# Patient Record
Sex: MALE | URBAN - METROPOLITAN AREA
[De-identification: names, ages, dates, MRNs, and addresses within clinical notes are randomized per-mention and may not be internally consistent; named-entity substitution may affect disease eponyms.]

---

## 2021-11-22 ENCOUNTER — OFFICE VISIT (OUTPATIENT)
Dept: FAMILY MEDICINE | Facility: CLINIC | Age: 26
End: 2021-11-22
Payer: MEDICAID

## 2021-11-22 VITALS
SYSTOLIC BLOOD PRESSURE: 118 MMHG | RESPIRATION RATE: 20 BRPM | HEIGHT: 65 IN | DIASTOLIC BLOOD PRESSURE: 73 MMHG | HEART RATE: 70 BPM | WEIGHT: 115 LBS | TEMPERATURE: 98.2 F | OXYGEN SATURATION: 100 % | BODY MASS INDEX: 19.16 KG/M2

## 2021-11-22 DIAGNOSIS — Z02.89 ENCOUNTER FOR HEALTH EXAMINATION OF REFUGEE: Primary | ICD-10-CM
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
## 2021-11-22 DIAGNOSIS — F43.23 ADJUSTMENT DISORDER WITH MIXED ANXIETY AND DEPRESSED MOOD: ICD-10-CM

## 2021-11-22 DIAGNOSIS — Z23 HIGH PRIORITY FOR 2019-NCOV VACCINE: ICD-10-CM

## 2021-11-22 LAB
ALBUMIN SERPL-MCNC: 4.1 G/DL (ref 3.5–5)
ALP SERPL-CCNC: 77 U/L (ref 45–120)
ALT SERPL W P-5'-P-CCNC: 12 U/L (ref 0–45)
ANION GAP SERPL CALCULATED.3IONS-SCNC: 11 MMOL/L (ref 5–18)
AST SERPL W P-5'-P-CCNC: 16 U/L (ref 0–40)
BILIRUB SERPL-MCNC: 0.7 MG/DL (ref 0–1)
BUN SERPL-MCNC: 10 MG/DL (ref 8–22)
CALCIUM SERPL-MCNC: 9.9 MG/DL (ref 8.5–10.5)
CHLORIDE BLD-SCNC: 104 MMOL/L (ref 98–107)
CHOLEST SERPL-MCNC: 156 MG/DL
CO2 SERPL-SCNC: 24 MMOL/L (ref 22–31)
CREAT SERPL-MCNC: 0.9 MG/DL (ref 0.7–1.3)
FASTING STATUS PATIENT QL REPORTED: NO
GFR SERPL CREATININE-BSD FRML MDRD: >90 ML/MIN/1.73M2
GLUCOSE BLD-MCNC: 95 MG/DL (ref 70–125)
HDLC SERPL-MCNC: 39 MG/DL
HIV 1+2 AB+HIV1 P24 AG SERPL QL IA: NEGATIVE
LDLC SERPL CALC-MCNC: 95 MG/DL
Lab: NORMAL
PERFORMING LABORATORY: NORMAL
POTASSIUM BLD-SCNC: 3.9 MMOL/L (ref 3.5–5)
PROT SERPL-MCNC: 6.8 G/DL (ref 6–8)
SODIUM SERPL-SCNC: 139 MMOL/L (ref 136–145)
SPECIMEN STATUS: NORMAL
T PALLIDUM AB SER QL: NONREACTIVE
TEST NAME: NORMAL
TRIGL SERPL-MCNC: 111 MG/DL

## 2021-11-22 PROCEDURE — 80061 LIPID PANEL: CPT | Performed by: STUDENT IN AN ORGANIZED HEALTH CARE EDUCATION/TRAINING PROGRAM

## 2021-11-22 PROCEDURE — 86803 HEPATITIS C AB TEST: CPT | Performed by: STUDENT IN AN ORGANIZED HEALTH CARE EDUCATION/TRAINING PROGRAM

## 2021-11-22 PROCEDURE — 87340 HEPATITIS B SURFACE AG IA: CPT | Performed by: STUDENT IN AN ORGANIZED HEALTH CARE EDUCATION/TRAINING PROGRAM

## 2021-11-22 PROCEDURE — 0004A COVID-19,PF,PFIZER (12+ YRS): CPT | Performed by: STUDENT IN AN ORGANIZED HEALTH CARE EDUCATION/TRAINING PROGRAM

## 2021-11-22 PROCEDURE — 86481 TB AG RESPONSE T-CELL SUSP: CPT | Performed by: STUDENT IN AN ORGANIZED HEALTH CARE EDUCATION/TRAINING PROGRAM

## 2021-11-22 PROCEDURE — 86706 HEP B SURFACE ANTIBODY: CPT | Performed by: STUDENT IN AN ORGANIZED HEALTH CARE EDUCATION/TRAINING PROGRAM

## 2021-11-22 PROCEDURE — 86682 HELMINTH ANTIBODY: CPT | Mod: 90 | Performed by: STUDENT IN AN ORGANIZED HEALTH CARE EDUCATION/TRAINING PROGRAM

## 2021-11-22 PROCEDURE — 86704 HEP B CORE ANTIBODY TOTAL: CPT | Performed by: STUDENT IN AN ORGANIZED HEALTH CARE EDUCATION/TRAINING PROGRAM

## 2021-11-22 PROCEDURE — 91300 COVID-19,PF,PFIZER (12+ YRS): CPT | Performed by: STUDENT IN AN ORGANIZED HEALTH CARE EDUCATION/TRAINING PROGRAM

## 2021-11-22 PROCEDURE — 87389 HIV-1 AG W/HIV-1&-2 AB AG IA: CPT | Performed by: STUDENT IN AN ORGANIZED HEALTH CARE EDUCATION/TRAINING PROGRAM

## 2021-11-22 PROCEDURE — 86708 HEPATITIS A ANTIBODY: CPT | Performed by: STUDENT IN AN ORGANIZED HEALTH CARE EDUCATION/TRAINING PROGRAM

## 2021-11-22 PROCEDURE — 86780 TREPONEMA PALLIDUM: CPT | Performed by: STUDENT IN AN ORGANIZED HEALTH CARE EDUCATION/TRAINING PROGRAM

## 2021-11-22 PROCEDURE — 86787 VARICELLA-ZOSTER ANTIBODY: CPT | Performed by: STUDENT IN AN ORGANIZED HEALTH CARE EDUCATION/TRAINING PROGRAM

## 2021-11-22 PROCEDURE — 36415 COLL VENOUS BLD VENIPUNCTURE: CPT | Performed by: STUDENT IN AN ORGANIZED HEALTH CARE EDUCATION/TRAINING PROGRAM

## 2021-11-22 PROCEDURE — 99385 PREV VISIT NEW AGE 18-39: CPT | Mod: 25 | Performed by: STUDENT IN AN ORGANIZED HEALTH CARE EDUCATION/TRAINING PROGRAM

## 2021-11-22 PROCEDURE — 87591 N.GONORRHOEAE DNA AMP PROB: CPT | Performed by: STUDENT IN AN ORGANIZED HEALTH CARE EDUCATION/TRAINING PROGRAM

## 2021-11-22 PROCEDURE — 99000 SPECIMEN HANDLING OFFICE-LAB: CPT | Performed by: STUDENT IN AN ORGANIZED HEALTH CARE EDUCATION/TRAINING PROGRAM

## 2021-11-22 PROCEDURE — 80053 COMPREHEN METABOLIC PANEL: CPT | Performed by: STUDENT IN AN ORGANIZED HEALTH CARE EDUCATION/TRAINING PROGRAM

## 2021-11-22 ASSESSMENT — ENCOUNTER SYMPTOMS
DYSURIA: 0
FEVER: 0
HEADACHES: 0
VOMITING: 0
ABDOMINAL PAIN: 0
COLOR CHANGE: 0
CHILLS: 0
DIZZINESS: 0
DYSPHORIC MOOD: 0
SORE THROAT: 0
BACK PAIN: 0
PALPITATIONS: 0
ARTHRALGIAS: 0
HEMATURIA: 0
RHINORRHEA: 0
COUGH: 0
SHORTNESS OF BREATH: 0
SEIZURES: 0

## 2021-11-22 ASSESSMENT — MIFFLIN-ST. JEOR: SCORE: 1439.13

## 2021-11-22 NOTE — NURSING NOTE
Due to patient being non-English speaking/uses sign language, an  was used for this visit. Only for face-to-face interpretation by an external agency, date and length of interpretation can be found on the scanned worksheet.     name: Darrius  Agency: [employer]  Language: [place] Island[place]   Telephone number: [phone]  Type of interpretation: Group, spoken; number of participants: 2

## 2021-11-22 NOTE — PATIENT INSTRUCTIONS
Patient Education     Picadura de chinche   Las chinches son insectos diminutos, aproximadamente del tamaño de leticia semilla de manzana. Tienen un color marrón rojizo y son ligeramente aplanadas y ovaladas. Se alimentan de la braden de los seres humanos, frecuentemente de noche mientras las personas duermen. Las chinches se sienten atraídas por el calor del cuerpo y también por la respiración. A diferencia de otros parásitos, pueden vivir hasta un año sin comer. No tienen magaña y no saltan. Nikunj se arrastran muy rápido.  Las chinches no son peligrosas y, por lo general, no transmiten enfermedades.  Síntomas de la picadura  Los síntomas de las picaduras de chinche pueden ser diferentes para cada persona. Las picaduras pueden encontrarse en cualquier parte del cuerpo. Nikunj son más comunes en la piel que está expuesta. Busque estos síntomas de 1 a varios días después de la primera picadura:    Picazón    Erupción rojiza, que puede empezar siendo pequeña y agrandarse    Urticaria, marcas valladares inflamadas (ronchas), o áreas valladares con protuberancia que pican (habones). Estos pueden ser puntos o pueden cubrir un área cal.    Bultos pequeños, firmes, planos    Ampollas    Mateo de ampollas en línea, en leticia ofe con curvas o en zigzag    Reacción alérgica    Infección en la piel a causa de rascarse las picaduras  Dónde se esconden las chinches  Las chinches pueden encontrarse keyona en cualquier lugar en el que usted pasa tiempo, tanto dentro ramos fuera del hogar. [place] incluye hoteles, autobuses, trenes, barcos, hogares de ancianos y apartamentos. Las chinches pueden transportarse de un lugar a otro en costuras y pliegues de equipaje, bolsas de dormir o ropa doblada.  Pueden estar en lugares limpios o sucios. Debido a stock tamaño y forma, pueden entrar en lugares muy pequeños donde no se le ocurriría buscar. Tienden a encontrarse mayormente en muebles, mobiliarios alrededor de la casa, ropa y grietas y fisuras. Aquí hay  algunas áreas en las que pueden encontrarse:    Gray y colchones, especialmente en las costuras    Juntas de los thomas de la cama, o en la cabecera    Sábanas y mantas    Sillones, lexie forradas con ramos y otros muebles que tengan ramos    Alfombras, especialmente en los bordes    Equipaje o vernell    Ropa    Detrás de los decorados de pared, chilango, espejos y alarmas contra incendio, y en tomacorrientes eléctricos  Cómo encontrarlas  Por stock tamaño, las chinches pueden verse. Nikunj también pueden dejar algunas pistas:    Puntos negros (heces) en los colchones de las denise, especialmente alrededor de las costuras    Manchas de braden en las sábanas    Cáscaras que pueden audrey perdido  Cuidados en la casa    Los síntomas de la picadura generalmente desaparecen solos en 1 o 2 semanas.    Para ayudar a prevenir la infección, evite lo más posible rascarse las picaduras.    Para aliviar la picazón y la inflamación, use leticia pomada o crema de venta ar que contenga hidrocortisona.    Si necesita más alivio, colóquese leticia compresa de hielo sobre las picaduras. Use la compresa de hielo derrek un jose de 20 minutos por vez. Para hacer leticia compresa de hielo, coloque cubitos de hielo en elticia bolsa plástica y ciérrela arriba. Envuelva la bolsa en leticia toalla o un paño limpio y joseph. Nunca aplique hielo o leticia compresa de hielo directamente sobre la piel.    Si tiene muchas picaduras o le pica mucho, tome un antihistamínico oral de venta ar. Siga las instrucciones en el paquete.    En alysia de que leticia picadura se infecte, es posible que stock proveedor de atención médica le recete un antibiótico. Puede tratarse de leticia píldora que se artem por boca. O puede ser leticia crema para ponerse sobre la piel.    Si lo picaron en stock casa, hable con leticia empresa de control de plagas autorizada. Las chinches no viven en stock cuerpo. Viven en grietas en stock casa. La empresa puede inspeccionar stock casa y ayudarlo a deshacerse de los insectos de  manera colmenares.    Cuidados después del procedimiento  Asista a los controles con stock proveedor de atención médica o ilir lo que le haya indicado.  Cuándo buscar atención médica  Llame a stock proveedor de atención médica de inmediato ante cualquiera de los siguientes síntomas:    Fiebre de 100.4  F (38  C) o más brianna, o según lo que le haya indicado el proveedor de atención médica    Síntomas de infección en las picaduras, ramos inflamación y dolor que empeora, calor en el área o supuración de las picaduras    Signos de leticia reacción alérgica, ramos urticaria o erupción que se extiende  Cuándo llamar al 911  Llame al 911 si ocurre algo de lo siguiente:    Picazón o inflamación de la garganta    Silbidos al respirar    [id number] [employer] Todos los derechos reservados. Esta información no pretende sustituir la atención médica profesional. Sólo stock médico puede diagnosticar y tratar un problema de saul.

## 2021-11-22 NOTE — PROGRESS NOTES
Preceptor Attestation:    I discussed the patient with the resident and evaluated the patient in person. I have verified the content of the note, which accurately reflects my assessment of the patient and the plan of care.   Supervising Physician:  Brad Flores MD.

## 2021-11-22 NOTE — PROGRESS NOTES
Initial Refugee Screening Exam    PC staff should enter immunizations into the chart:       Tdap, MMR, and Hep B on 8/13/21    COVID-19 vaccine SINOVAC 7/14/21, 8/11/21     used this visit: Visit conducted in [place]an, provider bilingual.  In-person iterpreter present for entire visit.     HEALTH HISTORY    Concerns today: bed bugs in home (confirmed with photo) & bites on body    Country of Origin:  [facility]  Year left country of Origin: 2021  Other countries lived in and dates: 2018 lived in [place] for 8 months  Date of Arrival in [place]: 11/4/2021  Is our listed age correct? Yes  Do you go by any other name?: No  Native Language: [place]  Family in [place]: Yes, mother and father  Family in other countries:  [facility]    Pre-Departure Medical Screening Examination Reviewed:Yes - No major health conditions noted  Class A conditions: No  Class B conditions: Yes - Adjustment disorder with mixed anxiety and depressed mood  Presumptive treatment for intestinal parasites?: Yes - albendazole, ivermectin  History of BCG vaccination: Unknown  Chronic or serious illness: No  Hospitalizations: Yes - 2018 with infection - no complication  Trauma: No    Family history, medication list, problem list and allergies were reviewed and updated as needed in Epic.    ROS:  Review of Systems   Constitutional: Negative for chills and fever.   HENT: Negative for rhinorrhea and sore throat.    Eyes: Negative for visual disturbance.   Respiratory: Negative for cough and shortness of breath.    Cardiovascular: Negative for chest pain and palpitations.   Gastrointestinal: Negative for abdominal pain and vomiting.   Genitourinary: Negative for dysuria and hematuria.   Musculoskeletal: Negative for arthralgias and back pain.   Skin: Positive for rash. Negative for color change.   Neurological: Negative for dizziness, seizures, syncope and headaches.   Psychiatric/Behavioral: Negative for dysphoric mood.   All other systems  "reviewed and are negative.      Mental Health:    1. In the past month, have you had many bad dreams or nightmares that remind you of   things that happened in your country or refugee camp? No  2. In the past month, have you felt very sad? No  3. In the past month, have you been thinking too much about the past (even if you did   not want to?) Yes - now feeling better that he's here - now doing bicycle exercise  4. In the past month, have you avoided situations that remind you of the past? No  (Prompt: Do you turn off the radio or TV if the program is disturbing?)   5. Do any of these problems make it difficult to do what you need to do on a daily   basis?  (Prompt: Are you able to take care of yourself and your family?) No      EXAMINATION:  /73   Pulse 70   Temp 98.2  F (36.8  C) (Oral)   Resp 20   Ht 1.66 m (5' 5.35\")   Wt 52.2 kg (115 lb)   SpO2 100%   BMI 18.93 kg/m       Physical Exam  Constitutional:       General: He is not in acute distress.     Appearance: He is not ill-appearing.   HENT:      Head: Normocephalic and atraumatic.      Mouth/Throat:      Mouth: Mucous membranes are moist.   Eyes:      General: No scleral icterus.     Extraocular Movements: Extraocular movements intact.      Conjunctiva/sclera: Conjunctivae normal.   Cardiovascular:      Rate and Rhythm: Normal rate and regular rhythm.      Pulses: Normal pulses.      Heart sounds: Normal heart sounds. No murmur heard.      Pulmonary:      Effort: Pulmonary effort is normal. No respiratory distress.      Breath sounds: Normal breath sounds. No wheezing.   Abdominal:      General: Bowel sounds are normal. There is no distension.      Palpations: Abdomen is soft.   Musculoskeletal:         General: Normal range of motion.   Skin:     General: Skin is warm and dry.      Findings: Rash present. Rash is macular and urticarial. Rash is not nodular.             Comments: Erythematous macules 1-3mm in diameter on hands and trunk "   Neurological:      General: No focal deficit present.      Mental Status: He is alert and oriented to person, place, and time.   Psychiatric:         Mood and Affect: Mood normal.         Behavior: Behavior normal.       Assessment & Plan     Corona was seen today for establish care and imm/inj.    Diagnoses and all orders for this visit:    Encounter for health examination of refugee  New refugee from [facility] who has been here for 2 weeks.  Adjusting well to [place] and doing well today.  -     TB Quantiferon Gold Plus; Future  -     Comprehensive Metabolic; Future  -     Lipid Cascade; Future  -     Syphilis Screen Cascade; Future  -     Strongyloides Ab,IgG (STRNG); Future  -     Schistosoma Deirdre; Future  -     HIV Ag/Ab Screen [place]; Future  -     Hepatitis A Immune Status; Future  -     Hepatitis B Core Ab; Future  -     Hepatitis B Surface Ab; Future  -     Hepatitis C Antibody; Future  -     Duy Zoster Imm Status Deirdre; Future  -     Hepatitis B Surface Ag; Future  -     Chlamydia/Gono Amplified; Future      Bedbug bite, initial encounter  Multiple bedbug bites noted on hands and trunk.  Patient has seen bedbugs on his mattress and other people in the home are aware of the infestation.  -  Continue hydrocortisone 1% cream twice daily as needed  -  Address bedbugs with everyone in the home  -  Handout on bedbugs provided    Adjustment disorder with mixed anxiety and depressed mood  Diagnosis noted on refugee exam prior to arrival.  Patient reports this is situational given he does not know his emigration status to where he was going.  Reports that mood is improved.  We will continue to monitor this.    High priority for 2019-nCoV vaccine  Due for booster today.  Got 2 doses of SINOVAC in [facility].  -     COVID-19,PF,PFIZER (12+ Yrs PURPLE LABEL)      Follow-up: Return in 23 days (on 12/15/2021) for As scheduled.    Patient dicussed with attending physician, Dr.Darin Flores, who agrees with the plan.    -----  Cordelia Howard MD  PGY-3  Family Medicine Resident

## 2021-11-23 LAB
GAMMA INTERFERON BACKGROUND BLD IA-ACNC: 0.04 IU/ML
HBV SURFACE AB SERPLBLD QL IA.RAPID: NEGATIVE
HBV SURFACE AG SERPL QL IA: NONREACTIVE
HCV AB SERPL QL IA: NEGATIVE
M TB IFN-G BLD-IMP: NEGATIVE
M TB IFN-G CD4+ BCKGRND COR BLD-ACNC: 9.96 IU/ML
MITOGEN IGNF BCKGRD COR BLD-ACNC: -0.01 IU/ML
MITOGEN IGNF BCKGRD COR BLD-ACNC: 0 IU/ML
N GONORRHOEA DNA SPEC QL NAA+PROBE: NEGATIVE
QUANTIFERON MITOGEN: 10 IU/ML
QUANTIFERON NIL TUBE: 0.04 IU/ML
QUANTIFERON TB1 TUBE: 0.04 IU/ML
QUANTIFERON TB2 TUBE: 0.03
VZV IGG SER QL IA: 2627 INDEX
VZV IGG SER QL IA: POSITIVE

## 2021-11-24 LAB
HAV IGG SER QL IA: POSITIVE
HBV CORE AB SERPL QL IA: NEGATIVE

## 2021-11-25 LAB — STRONGYLOIDES IGG SER IA-ACNC: 0.5 IV

## 2021-11-26 LAB — MAYO MISC RESULT: NORMAL

## 2021-11-30 LAB — SCHISTOSOMA IGG SER QL: NEGATIVE

## 2021-12-15 ENCOUNTER — OFFICE VISIT (OUTPATIENT)
Dept: FAMILY MEDICINE | Facility: CLINIC | Age: 26
End: 2021-12-15
Payer: MEDICAID

## 2021-12-15 VITALS
HEART RATE: 73 BPM | TEMPERATURE: 98 F | RESPIRATION RATE: 20 BRPM | WEIGHT: 113.8 LBS | SYSTOLIC BLOOD PRESSURE: 115 MMHG | OXYGEN SATURATION: 96 % | BODY MASS INDEX: 19.43 KG/M2 | HEIGHT: 64 IN | DIASTOLIC BLOOD PRESSURE: 72 MMHG

## 2021-12-15 DIAGNOSIS — Z78.9 NONIMMUNE TO HEPATITIS B VIRUS: ICD-10-CM

## 2021-12-15 DIAGNOSIS — R76.8 HEPATITIS A ANTIBODY POSITIVE: ICD-10-CM

## 2021-12-15 DIAGNOSIS — Z02.89 ENCOUNTER FOR HEALTH EXAMINATION OF REFUGEE: Primary | ICD-10-CM

## 2021-12-15 PROCEDURE — 90715 TDAP VACCINE 7 YRS/> IM: CPT | Performed by: STUDENT IN AN ORGANIZED HEALTH CARE EDUCATION/TRAINING PROGRAM

## 2021-12-15 PROCEDURE — 90472 IMMUNIZATION ADMIN EACH ADD: CPT | Performed by: STUDENT IN AN ORGANIZED HEALTH CARE EDUCATION/TRAINING PROGRAM

## 2021-12-15 PROCEDURE — 99213 OFFICE O/P EST LOW 20 MIN: CPT | Mod: 25 | Performed by: STUDENT IN AN ORGANIZED HEALTH CARE EDUCATION/TRAINING PROGRAM

## 2021-12-15 PROCEDURE — 90744 HEPB VACC 3 DOSE PED/ADOL IM: CPT | Performed by: STUDENT IN AN ORGANIZED HEALTH CARE EDUCATION/TRAINING PROGRAM

## 2021-12-15 PROCEDURE — 90707 MMR VACCINE SC: CPT | Performed by: STUDENT IN AN ORGANIZED HEALTH CARE EDUCATION/TRAINING PROGRAM

## 2021-12-15 PROCEDURE — 90686 IIV4 VACC NO PRSV 0.5 ML IM: CPT | Performed by: STUDENT IN AN ORGANIZED HEALTH CARE EDUCATION/TRAINING PROGRAM

## 2021-12-15 PROCEDURE — 90471 IMMUNIZATION ADMIN: CPT | Performed by: STUDENT IN AN ORGANIZED HEALTH CARE EDUCATION/TRAINING PROGRAM

## 2021-12-15 PROCEDURE — 90651 9VHPV VACCINE 2/3 DOSE IM: CPT | Performed by: STUDENT IN AN ORGANIZED HEALTH CARE EDUCATION/TRAINING PROGRAM

## 2021-12-15 ASSESSMENT — ENCOUNTER SYMPTOMS
SLEEP DISTURBANCE: 0
ABDOMINAL PAIN: 0
NERVOUS/ANXIOUS: 0
DYSPHORIC MOOD: 0
CONSTIPATION: 0
ABDOMINAL DISTENTION: 0
HEADACHES: 0
CHILLS: 0
FATIGUE: 0
SHORTNESS OF BREATH: 0
CHEST TIGHTNESS: 0
AGITATION: 0
FEVER: 0
SEIZURES: 0
DIZZINESS: 0
ARTHRALGIAS: 0

## 2021-12-15 ASSESSMENT — MIFFLIN-ST. JEOR: SCORE: 1415.37

## 2021-12-15 NOTE — NURSING NOTE
Giving 0.5 in ped instead of adult 1ml in of Hep B.  For next visit please give the other half of 0.5 in ped of hep B to make it full dose of 1ml.    Iam Sena MA

## 2021-12-15 NOTE — PROGRESS NOTES
[facility] - Office Visit    ASSESSMENT & PLAN     Encounter for health examination of refugee  Nonimmune to hepatitis B virus  Patient is here with his mother for follow-up of refugee visit.  We reviewed labs from last visit which revealed somewhat elevated LDL than expected for his age range.  Labs also revealed nonimmune to hepatitis B and positive antibody to hepatitis A. remainder of labs are unremarkable and patient has no concerns today.  -     INFLUENZA VACCINE IM > 6 MONTHS VALENT IIV4 (AFLURIA/FLUZONE)  -     HPV9 (Gardasil 9 )  -     TDAP VACCINE (Adacel, Boostrix)  [[id number]]   -     HEPATITIS B VACCINE,PED/ADOL,IM  -     MMR VIRUS IMMUNIZATION, SUBCUT  -   Increase exercise--patient willing to try walking 30 minutes daily up to 3 times weekly    Hepatitis A antibody positive  Likely due to resolved prior infection.      Patient Instructions   aumenta stock ejercicio--- camine 30 min cada yamila 3 veces al semana    Follow-up: Return in about 1 year (around 12/15/2022) for Routine preventive.    The patient was seen by, and discussed with, Dr. Juwan Mares who agrees with the plan.  -----  Cordelia Howard MD  PGY-3  [facility]       SUBJECTIVE       Corona Foster is a 26 year old  male who presents to clinic today for the following  accompanied by his mother for refugee follow-up visit.    No concerns today.  We reviewed labs from last visit which were largely unremarkable.  He did have somewhat elevated LDL and total cholesterol for his age, hepatitis B nonimmune, and hepatitis A antibody.      Bed bug rash is resolved since discarding old mattress.    Review of Systems   Constitutional: Negative for chills, fatigue and fever.   HENT: Negative.    Respiratory: Negative for chest tightness and shortness of breath.    Gastrointestinal: Negative for abdominal distention, abdominal pain and constipation.   Genitourinary: Negative.    Musculoskeletal: Negative for arthralgias.   Skin:  "Negative for rash.   Neurological: Negative for dizziness, seizures and headaches.   Psychiatric/Behavioral: Negative for agitation, behavioral problems, dysphoric mood and sleep disturbance. The patient is not nervous/anxious.           OBJECTIVE   /72 (BP Location: Right arm, Patient Position: Sitting, Cuff Size: Adult Regular)   Pulse 73   Temp 98  F (36.7  C) (Oral)   Resp 20   Ht 1.631 m (5' 4.2\")   Wt 51.6 kg (113 lb 12.8 oz)   SpO2 96%   BMI 19.41 kg/m       Physical Exam  Constitutional:       Appearance: Normal appearance. He is normal weight.   Cardiovascular:      Rate and Rhythm: Normal rate and regular rhythm.      Pulses: Normal pulses.      Heart sounds: Normal heart sounds.   Pulmonary:      Effort: Pulmonary effort is normal.      Breath sounds: Normal breath sounds.   Abdominal:      General: Abdomen is flat.      Palpations: Abdomen is soft.   Skin:     General: Skin is warm.   Neurological:      General: No focal deficit present.      Mental Status: He is alert and oriented to person, place, and time.   Psychiatric:         Mood and Affect: Mood normal.       Office Visit on 11/22/2021     Component Value     Neisseria gonorrhoeae Negative      Hepatitis B Surface Antigen Nonreactive      VZV Deirdre IgG Instrument Value 2,627.0*     Varicella Zoster Antibody IgG Positive*     Hepatitis C Antibody Negative      Hepatitis B Surface Antibody Negative      Hepatitis B Core Antibody Total Negative      Hepatitis A Antibody IgG Positive*     HIV Antigen Antibody Combo Negative      Strongyloides Deirdre IgG 0.5      Treponema Antibody Total Nonreactive      Cholesterol 156      Triglycerides 111      Direct Measure HDL 39*     LDL Cholesterol Calculated 95      Patient Fasting > 8hrs? No      Sodium 139      Potassium 3.9      Chloride 104      Carbon Dioxide (CO2) 24      Anion Gap 11      Urea Nitrogen 10      Creatinine 0.90      Calcium 9.9      Glucose 95      Alkaline Phosphatase 77      " AST 16      ALT 12      Protein Total 6.8      Albumin 4.1      Bilirubin Total 0.7      GFR Estimate >90      Quantiferon Nil Tube 0.04      Quantiferon TB1 Tube 0.04      Quantiferon TB2 Tube 0.03      Quantiferon Mitogen 10.00      Schistosoma Ab IgG Negative      Quantiferon-TB Gold Plus Negative      TB1 Ag minus Nil Value 0.00      TB2 Ag minus Nil Value -0.01      Mitogen minus Nil Result 9.96      Nil Result 0.04

## 2022-01-19 ENCOUNTER — OFFICE VISIT (OUTPATIENT)
Dept: FAMILY MEDICINE | Facility: CLINIC | Age: 27
End: 2022-01-19
Payer: COMMERCIAL

## 2022-01-19 DIAGNOSIS — Z00.00 ROUTINE GENERAL MEDICAL EXAMINATION AT A HEALTH CARE FACILITY: Primary | ICD-10-CM

## 2022-01-19 DIAGNOSIS — H61.23 BILATERAL IMPACTED CERUMEN: ICD-10-CM

## 2022-01-19 LAB
ERYTHROCYTE [DISTWIDTH] IN BLOOD BY AUTOMATED COUNT: 11.8 % (ref 10–15)
HCT VFR BLD AUTO: 43.2 % (ref 40–53)
HGB BLD-MCNC: 14.6 G/DL (ref 13.3–17.7)
MCH RBC QN AUTO: 29.5 PG (ref 26.5–33)
MCHC RBC AUTO-ENTMCNC: 33.8 G/DL (ref 31.5–36.5)
MCV RBC AUTO: 87 FL (ref 78–100)
PLATELET # BLD AUTO: 252 10E3/UL (ref 150–450)
RBC # BLD AUTO: 4.95 10E6/UL (ref 4.4–5.9)
WBC # BLD AUTO: 5.3 10E3/UL (ref 4–11)

## 2022-01-19 PROCEDURE — 99395 PREV VISIT EST AGE 18-39: CPT | Mod: 25 | Performed by: STUDENT IN AN ORGANIZED HEALTH CARE EDUCATION/TRAINING PROGRAM

## 2022-01-19 PROCEDURE — 85027 COMPLETE CBC AUTOMATED: CPT | Performed by: STUDENT IN AN ORGANIZED HEALTH CARE EDUCATION/TRAINING PROGRAM

## 2022-01-19 PROCEDURE — 82306 VITAMIN D 25 HYDROXY: CPT | Performed by: STUDENT IN AN ORGANIZED HEALTH CARE EDUCATION/TRAINING PROGRAM

## 2022-01-19 PROCEDURE — 69209 REMOVE IMPACTED EAR WAX UNI: CPT | Mod: 50 | Performed by: STUDENT IN AN ORGANIZED HEALTH CARE EDUCATION/TRAINING PROGRAM

## 2022-01-19 PROCEDURE — 36415 COLL VENOUS BLD VENIPUNCTURE: CPT | Performed by: STUDENT IN AN ORGANIZED HEALTH CARE EDUCATION/TRAINING PROGRAM

## 2022-01-19 ASSESSMENT — ENCOUNTER SYMPTOMS
PALPITATIONS: 0
DYSPHORIC MOOD: 0
RHINORRHEA: 0
ARTHRALGIAS: 0
SORE THROAT: 1
VOMITING: 0
BACK PAIN: 0
FEVER: 0
DYSURIA: 0
COUGH: 0
HEMATURIA: 0
SHORTNESS OF BREATH: 0
SEIZURES: 0
CHILLS: 0
COLOR CHANGE: 0
DIZZINESS: 0
HEADACHES: 0
ABDOMINAL PAIN: 0

## 2022-01-19 NOTE — PROGRESS NOTES
Preceptor Attestation:    I discussed the patient with the resident and evaluated the patient in person. I have verified the content of the note, which accurately reflects my assessment of the patient and the plan of care.   Supervising Physician:  Amrik Abdul MD.

## 2022-01-19 NOTE — PROGRESS NOTES
[facility], [place] - Office Visit - Physical Exam    ASSESSMENT & PLAN     Diagnoses and all orders for this visit:    Routine general medical examination at a health care facility  Otherwise healthy 27 yo male new refugee her for physical exam.  Has no concerns today.  Exam unremarkable.  Labs from prior visit consistent with elevated total cholesterol and low HDL.  Working on lifestyle changes.  -     CBC with platelets; Future  -     Vitamin D deficiency screening; Future    Bilateral impacted cerumen  s/p irrigation cleaning of ears with direct visualization of resolution    Follow-up: Return in about 1 year (around 1/19/2023) for Routine preventive.    The patient was seen by, and discussed with, Dr. Amrik Abdul who agrees with the plan.  -----  Cordelia Howard MD  PGY-3  [facility], [place]       SUBJECTIVE       Corona Foster is a 26 year old  male who presents to clinic today for physical exam.    Sore throat x 4 days -- covid negative on Saturday, Sunday.  Symptoms better now.    Review of Systems   Constitutional: Negative for chills and fever.   HENT: Positive for sore throat (nearly resolved (had x 4 days)). Negative for rhinorrhea.    Eyes: Negative for visual disturbance.   Respiratory: Negative for cough and shortness of breath.    Cardiovascular: Negative for chest pain and palpitations.   Gastrointestinal: Negative for abdominal pain and vomiting.   Genitourinary: Negative for dysuria and hematuria.   Musculoskeletal: Negative for arthralgias and back pain.   Skin: Negative for color change and rash.   Neurological: Negative for dizziness, seizures, syncope and headaches.   Psychiatric/Behavioral: Negative for dysphoric mood.   All other systems reviewed and are negative.        HEALTH HISTORY   No past medical history on file. - none significant    No current outpatient medications on file.     No current facility-administered medications for this visit.       Family History   Problem  Relation Age of Onset     Cataracts Mother      Breast Cancer Cousin      Diabetes Other      Social History: recent refugee to the [place] this year    RISK BEHAVIORS AND HEALTHY HABITS  Tobacco Use/Smoking: none  Illicit Drug Use: none  Alcohol use: none  Sexually Active: no  Sexual concerns: none  STD History: none    Diet (5-7 servings of fruits/veg daily): yes  Exercise (30 min accumulated most days):once weekly  Dental Care: yes  Calcium 1500 mg/d: no    Has anyone hurt you physically, for example by pushing, hitting, slapping or kicking you or forcing you to have sex? Denies  Do you feel threatened or controlled by a partner, ex-partner or anyone in your life? Denies        OBJECTIVE   There were no vitals taken for this visit.     Physical Exam  Constitutional:       General: He is not in acute distress.     Appearance: He is not ill-appearing.   HENT:      Head: Normocephalic and atraumatic.      Right Ear: Hearing, tympanic membrane and ear canal normal. There is impacted cerumen.      Left Ear: Hearing, tympanic membrane and ear canal normal. There is impacted cerumen.      Ears:      Comments: After irrigation, cerumen completely removed - ear canals, TMs normal appearing     Mouth/Throat:      Mouth: Mucous membranes are moist.   Eyes:      General: No scleral icterus.     Extraocular Movements: Extraocular movements intact.      Conjunctiva/sclera: Conjunctivae normal.   Cardiovascular:      Rate and Rhythm: Normal rate and regular rhythm.      Pulses: Normal pulses.      Heart sounds: Normal heart sounds. No murmur heard.      Pulmonary:      Effort: Pulmonary effort is normal. No respiratory distress.      Breath sounds: Normal breath sounds. No wheezing.   Abdominal:      General: Bowel sounds are normal. There is no distension.      Palpations: Abdomen is soft.   Musculoskeletal:         General: Normal range of motion.   Skin:     General: Skin is warm and dry.      Findings: No rash.   Neurological:       General: No focal deficit present.      Mental Status: He is alert and oriented to person, place, and time.   Psychiatric:         Mood and Affect: Mood normal.         Behavior: Behavior normal.

## 2022-01-20 ENCOUNTER — TELEPHONE (OUTPATIENT)
Dept: FAMILY MEDICINE | Facility: CLINIC | Age: 27
End: 2022-01-20
Payer: COMMERCIAL

## 2022-01-20 DIAGNOSIS — E55.9 VITAMIN D INSUFFICIENCY: Primary | ICD-10-CM

## 2022-01-20 LAB — DEPRECATED CALCIDIOL+CALCIFEROL SERPL-MC: 24 UG/L (ref 30–80)

## 2022-01-20 RX ORDER — CHOLECALCIFEROL (VITAMIN D3) 50 MCG
1 TABLET ORAL DAILY
Qty: 90 TABLET | Refills: 3 | Status: SHIPPED | OUTPATIENT
Start: 2022-01-20

## 2022-01-20 NOTE — TELEPHONE ENCOUNTER
Test results show vitamin D insufficiency and normal CBC.  We will start patient on vitamin D supplement.  Called to notify patient - he and his mother are doing well.    ----  Cordelia Howard MD  PGY-3  Family Medicine Resident      Office Visit on 01/19/2022   Component Date Value Ref Range Status     WBC Count 01/19/2022 5.3  4.0 - 11.0 10e3/uL Final     RBC Count 01/19/2022 4.95  4.40 - 5.90 10e6/uL Final     Hemoglobin 01/19/2022 14.6  13.3 - 17.7 g/dL Final     Hematocrit 01/19/2022 43.2  40.0 - 53.0 % Final     MCV 01/19/2022 87  78 - 100 fL Final     MCH 01/19/2022 29.5  26.5 - 33.0 pg Final     MCHC 01/19/2022 33.8  31.5 - 36.5 g/dL Final     RDW 01/19/2022 11.8  10.0 - 15.0 % Final     Platelet Count 01/19/2022 252  150 - 450 10e3/uL Final     Vitamin D, Total (25-Hydroxy) 01/19/2022 24* 30 - 80 ug/L Final